# Patient Record
Sex: MALE | Race: WHITE | NOT HISPANIC OR LATINO | Employment: OTHER | URBAN - METROPOLITAN AREA
[De-identification: names, ages, dates, MRNs, and addresses within clinical notes are randomized per-mention and may not be internally consistent; named-entity substitution may affect disease eponyms.]

---

## 2019-05-28 ENCOUNTER — TELEPHONE (OUTPATIENT)
Dept: RHEUMATOLOGY | Facility: CLINIC | Age: 54
End: 2019-05-28

## 2019-05-28 NOTE — TELEPHONE ENCOUNTER
----- Message from [facility] sent at 5/28/2019  3:36 PM CDT -----  Contact: pt   pt , would like to schedule a NP appt, and checkjing status on if the referral have reached your office from [facility], thanks.

## 2019-05-28 NOTE — TELEPHONE ENCOUNTER
Returned patient call regarding new patient appointment. Informed that [facility] does not have any openings at this time. Offered appointments with other providers at other locations. Patient began yelling stating this is pathetic that a person has to wait 6 months to see a doctor. State I can not even get out of bed. Nurse apologized for any inconvenience and offered again other providers at other locations. Patient stated don't worry about it and hung up the phone

## 2023-10-31 PROBLEM — R11.0 NAUSEA: Status: ACTIVE | Noted: 2023-10-31

## 2023-10-31 PROBLEM — R07.9 CHEST PAIN: Status: ACTIVE | Noted: 2023-10-31

## 2023-10-31 PROBLEM — I10 PRIMARY HYPERTENSION: Status: ACTIVE | Noted: 2023-10-31

## 2023-10-31 PROBLEM — R00.2 PALPITATIONS: Status: ACTIVE | Noted: 2023-10-31

## 2023-10-31 PROBLEM — E11.9 TYPE 2 DIABETES MELLITUS, WITHOUT LONG-TERM CURRENT USE OF INSULIN: Status: ACTIVE | Noted: 2023-10-31

## 2023-10-31 PROBLEM — M06.9 RHEUMATOID ARTHRITIS: Status: ACTIVE | Noted: 2023-10-31

## 2023-10-31 PROBLEM — I50.22 HEART FAILURE WITH MILDLY REDUCED EJECTION FRACTION (HFMREF): Status: ACTIVE | Noted: 2023-10-31

## 2023-10-31 PROBLEM — E86.0 DEHYDRATION: Status: ACTIVE | Noted: 2023-10-31
